# Patient Record
Sex: FEMALE | Race: BLACK OR AFRICAN AMERICAN | NOT HISPANIC OR LATINO | URBAN - METROPOLITAN AREA
[De-identification: names, ages, dates, MRNs, and addresses within clinical notes are randomized per-mention and may not be internally consistent; named-entity substitution may affect disease eponyms.]

---

## 2024-08-07 ENCOUNTER — OFFICE VISIT (OUTPATIENT)
Dept: OBSTETRICS AND GYNECOLOGY | Facility: CLINIC | Age: 34
End: 2024-08-07
Payer: COMMERCIAL

## 2024-08-07 VITALS
WEIGHT: 203 LBS | HEIGHT: 61 IN | SYSTOLIC BLOOD PRESSURE: 132 MMHG | DIASTOLIC BLOOD PRESSURE: 80 MMHG | BODY MASS INDEX: 38.33 KG/M2

## 2024-08-07 DIAGNOSIS — N93.0 POSTCOITAL BLEEDING: Primary | ICD-10-CM

## 2024-08-07 DIAGNOSIS — Z11.3 ROUTINE SCREENING FOR STI (SEXUALLY TRANSMITTED INFECTION): ICD-10-CM

## 2024-08-07 DIAGNOSIS — N86 CERVICAL EROSION: ICD-10-CM

## 2024-08-07 PROCEDURE — 87591 N.GONORRHOEAE DNA AMP PROB: CPT

## 2024-08-07 PROCEDURE — 3008F BODY MASS INDEX DOCD: CPT | Performed by: OBSTETRICS & GYNECOLOGY

## 2024-08-07 PROCEDURE — 87491 CHLMYD TRACH DNA AMP PROBE: CPT

## 2024-08-07 PROCEDURE — 1036F TOBACCO NON-USER: CPT | Performed by: OBSTETRICS & GYNECOLOGY

## 2024-08-07 PROCEDURE — 99204 OFFICE O/P NEW MOD 45 MIN: CPT | Performed by: OBSTETRICS & GYNECOLOGY

## 2024-08-07 NOTE — PROGRESS NOTES
Subjective   Patient ID: Pam Bains is a 33 y.o. female who presents for Consult.  New patient.  33 years old.  She has a boyfriend.  No pregnancies.  No plans to conceive currently but possibly in the future.  Using condoms for contraception no meds no surgeries.  No medical allergies    Cycles are monthly lasting about 5 days first 2 days heavy 1 day of cramping    She states that for the past month or so she has been having bleeding after sex.  She has been with her current boyfriend for 3 months.  On exam today I see a cervical ectropion.  We reviewed that this is a relatively common condition that is not dangerous but can cause symptoms.  We will send her urine for an STD screen.  I have given her the option of a LEEP procedure if it is affecting her quality of life.  She will consider it.  I will recommend that she follow-up in the next month or 2 for her annual exam and let me know if she wants to do anything regarding the postcoital bleeding        Review of Systems   Genitourinary:  Positive for vaginal bleeding.       Objective   Physical Exam  Genitourinary:     General: Normal vulva.            Comments: Cervical ectropion identified as likely source of bleeding        Assessment/Plan   Postcoital bleeding likely secondary to cervical ectropion.  Last Pap 2023 was normal.  Follow-up for annual exam.  Consider LEEP procedure if she wants to have this addressed         Eran Sanz MD 08/07/24 2:42 PM

## 2024-08-08 LAB
C TRACH RRNA SPEC QL NAA+PROBE: NEGATIVE
N GONORRHOEA DNA SPEC QL PROBE+SIG AMP: NEGATIVE

## 2024-08-14 ENCOUNTER — APPOINTMENT (OUTPATIENT)
Dept: OBSTETRICS AND GYNECOLOGY | Facility: CLINIC | Age: 34
End: 2024-08-14
Payer: COMMERCIAL

## 2024-09-04 ENCOUNTER — APPOINTMENT (OUTPATIENT)
Dept: OBSTETRICS AND GYNECOLOGY | Facility: CLINIC | Age: 34
End: 2024-09-04
Payer: COMMERCIAL

## 2024-09-24 ENCOUNTER — APPOINTMENT (OUTPATIENT)
Dept: OBSTETRICS AND GYNECOLOGY | Facility: CLINIC | Age: 34
End: 2024-09-24
Payer: COMMERCIAL

## 2025-04-04 ENCOUNTER — OFFICE VISIT (OUTPATIENT)
Dept: URGENT CARE | Age: 35
End: 2025-04-04
Payer: COMMERCIAL

## 2025-04-04 VITALS
BODY MASS INDEX: 36.84 KG/M2 | WEIGHT: 195 LBS | RESPIRATION RATE: 16 BRPM | SYSTOLIC BLOOD PRESSURE: 138 MMHG | HEART RATE: 63 BPM | DIASTOLIC BLOOD PRESSURE: 80 MMHG | OXYGEN SATURATION: 97 % | TEMPERATURE: 98.6 F

## 2025-04-04 DIAGNOSIS — S83.92XA SPRAIN OF LEFT KNEE, UNSPECIFIED LIGAMENT, INITIAL ENCOUNTER: Primary | ICD-10-CM

## 2025-04-04 PROCEDURE — 99203 OFFICE O/P NEW LOW 30 MIN: CPT | Performed by: NURSE PRACTITIONER

## 2025-04-04 PROCEDURE — 1036F TOBACCO NON-USER: CPT | Performed by: NURSE PRACTITIONER

## 2025-04-04 RX ORDER — IBUPROFEN 800 MG/1
800 TABLET ORAL EVERY 6 HOURS PRN
Qty: 28 TABLET | Refills: 0 | Status: SHIPPED | OUTPATIENT
Start: 2025-04-04 | End: 2025-04-11

## 2025-04-04 ASSESSMENT — ENCOUNTER SYMPTOMS
WOUND: 0
JOINT SWELLING: 0
NUMBNESS: 0
ARTHRALGIAS: 1
FEVER: 0
COLOR CHANGE: 0
FATIGUE: 0

## 2025-04-04 ASSESSMENT — PAIN SCALES - GENERAL: PAINLEVEL_OUTOF10: 7

## 2025-04-04 NOTE — PROGRESS NOTES
Subjective   Patient ID: Pam Bains is a 34 y.o. female. They present today with a chief complaint of Injury (Left knee popped yesterday).    History of Present Illness  Otherwise healthy 34-year-old female presents to the clinic today for evaluation of the left knee pain.  Patient states she was doing jumping jacks with weights in her hands (totaling 20 pounds) and when she landed she felt her left knee give out and go inwards.  States she is having pain when she walks on it and feeling like she is having to walk on the outside of her foot.  [place]  Denies previous trauma/injury.  Denies numbness and tingling.  No OTC measures taken yet at this time.          Past Medical History  Allergies as of 04/04/2025    (No Known Allergies)       (Not in a hospital admission)       History reviewed. No pertinent past medical history.    History reviewed. No pertinent surgical history.     reports that she has never smoked. She has never used smokeless tobacco.    Review of Systems  Review of Systems   Constitutional:  Negative for fatigue and fever.   Musculoskeletal:  Positive for arthralgias. Negative for joint swelling.   Skin:  Negative for color change and wound.   Neurological:  Negative for numbness.   All other systems reviewed and are negative.                                 Objective    Vitals:    04/04/25 0811   BP: 138/80   Pulse: 63   Resp: 16   Temp: 37 °C (98.6 °F)   TempSrc: Oral   SpO2: 97%   Weight: 88.5 kg (195 lb)     Patient's last menstrual period was 03/26/2025.    Physical Exam  Vitals and nursing note reviewed.   Constitutional:       Appearance: Normal appearance.   HENT:      Head: Normocephalic and atraumatic.   Cardiovascular:      Pulses: Normal pulses.   Pulmonary:      Effort: Pulmonary effort is normal.   Musculoskeletal:      Left knee: Swelling present. No effusion, erythema, ecchymosis, bony tenderness or crepitus. Normal range of motion. Tenderness  present. Normal alignment.   Skin:     General: Skin is warm and dry.      Capillary Refill: Capillary refill takes less than 2 seconds.   Neurological:      General: No focal deficit present.      Mental Status: She is alert and oriented to person, place, and time.         Procedures    Point of Care Test & Imaging Results from this visit  No results found for this visit on 04/04/25.   Imaging  No results found.    Cardiology, Vascular, and Other Imaging  No other imaging results found for the past 2 days      Diagnostic study results (if any) were reviewed by Kristin L Schoenlein, APRN-CNP.    Assessment/Plan   Allergies, medications, history, and pertinent labs/EKGs/Imaging reviewed by Kristin L Schoenlein, APRN-CNP.     Medical Decision Making  VSS, NAD, Nontoxic appearing.  Left knee has a small healing abrasion from patient scratching her knee at a concert on Tuesday.  No generalized edema.  There is no erythema, ecchymosis, effusion, lesions, masses, rashes.  Patient has full range of motion throughout, however, pain with extension.  Tenderness to posterior knee.  No step-off.  Pushes 5/5 equal bilaterally.    Symptoms, history, and exam are consistent with: Left knee strain.  Placed patient on NSAIDs, RICE, encouraged to get J hinged knee brace at a drugstore or online as we do not currently have any in the clinic.  No referral for sports med made on patient's behalf if worsening/not improving.  No indication for imaging at this time.    Differential Dx include, however, are not limited to: Fracture, dislocation, effusion    I have a low suspicion for any acute pathologies requiring emergent evaluation and further workup at this time.  I believe patient is safe to discharge home with a low threshold for emergency room as discussed during visit.  We discussed close follow-up with primary care provider/pediatrician. Supportive care discussed.  Medication(s) profile of OTC and Rxed medication(s) if prescribed  was (were) reviewed.  All questions answered and addressed.  Patient verbalized understanding.      Orders and Diagnoses  Diagnoses and all orders for this visit:  Sprain of left knee, unspecified ligament, initial encounter  -     ibuprofen 800 mg tablet; Take 1 tablet (800 mg) by mouth every 6 hours if needed (pain/swelling) for up to 7 days. Take with food  -     Referral to Orthopedics and Sports Medicine; Future      Medical Admin Record      Patient disposition: Home    Electronically signed by Kristin L Schoenlein, APRN-CNP  8:42 AM

## 2025-04-04 NOTE — PATIENT INSTRUCTIONS
Thank you for letting me care for you today.  You have been seen today for left knee sprain  Rest  Ice 20 minutes on the area 3-4 times a day  Compression with knee sleeve  Elevate while resting  Follow-up with Ortho if not improving/worsening  Please follow up with your primary care provider/pediatrician as directed.   If one is needed, please call [phone].  Please seek care in emergency room for red flags as discussed during visit.

## 2025-04-04 NOTE — LETTER
April 4, 2025     Patient: Pam Bains   YOB: 1990   Date of Visit: 4/4/2025       To Whom It May Concern:    Pam Bains was seen in my clinic on 4/4/2025 at 8:00 am. Please excuse Pam for her absence from work on this day to make the appointment.  Patient may return to work on 4/6/2025 without restrictions.    If you have any questions or concerns, please don't hesitate to call.         Sincerely,         Kristin L Schoenlein, APRN-CNP        CC: No Recipients

## 2025-04-15 ENCOUNTER — APPOINTMENT (OUTPATIENT)
Facility: CLINIC | Age: 35
End: 2025-04-15
Payer: COMMERCIAL

## 2025-04-15 VITALS
BODY MASS INDEX: 38.33 KG/M2 | DIASTOLIC BLOOD PRESSURE: 84 MMHG | WEIGHT: 203 LBS | HEIGHT: 61 IN | SYSTOLIC BLOOD PRESSURE: 135 MMHG

## 2025-04-15 DIAGNOSIS — N93.0 POSTCOITAL BLEEDING: Primary | ICD-10-CM

## 2025-04-15 DIAGNOSIS — N86 CERVICAL ECTROPION: ICD-10-CM

## 2025-04-15 PROCEDURE — 99459 PELVIC EXAMINATION: CPT | Performed by: OBSTETRICS & GYNECOLOGY

## 2025-04-15 PROCEDURE — 1036F TOBACCO NON-USER: CPT | Performed by: OBSTETRICS & GYNECOLOGY

## 2025-04-15 PROCEDURE — 99214 OFFICE O/P EST MOD 30 MIN: CPT | Performed by: OBSTETRICS & GYNECOLOGY

## 2025-04-15 PROCEDURE — 3008F BODY MASS INDEX DOCD: CPT | Performed by: OBSTETRICS & GYNECOLOGY

## 2025-04-15 ASSESSMENT — PATIENT HEALTH QUESTIONNAIRE - PHQ9
SUM OF ALL RESPONSES TO PHQ9 QUESTIONS 1 & 2: 0
1. LITTLE INTEREST OR PLEASURE IN DOING THINGS: NOT AT ALL
2. FEELING DOWN, DEPRESSED OR HOPELESS: NOT AT ALL

## 2025-04-15 NOTE — PROGRESS NOTES
"SUBJECTIVE    34 y.o.  Having periods female presents for   Chief Complaint   Patient presents with    Vaginal Bleeding     Abnormal bleeding      Pt presents for problem visit. She is complaining of bleeding after intercourse.  Menses are regular, but for the last year she has had intermittent bleeding after intercourse.  She saw Dr. Sanz and was diagnosed with a cervical ectropion.  A LEEP was discussed. After her last episode of intercourse she bled heavy \"like a period.\" Bleeding always stops quickly and she does not wear a pad.    Last PAP 2023 was normal/neg.    OB/GYN History  Patient's last menstrual period was 2025.    Social History     Substance and Sexual Activity   Sexual Activity Yes    Partners: Male       OB History    Para Term  AB Living   0 0 0 0 0 0   SAB IAB Ectopic Multiple Live Births   0 0 0 0 0       Past Medical History  She has no past medical history on file.    Surgical History  She has no past surgical history on file.     Social History  She reports that she has never smoked. She has never used smokeless tobacco. She reports current alcohol use. She reports that she does not use drugs.    Medications:  No current outpatient medications on file.    Screenings  Social Drivers of Health     Tobacco Use: Low Risk  (4/15/2025)    Patient History     Smoking Tobacco Use: Never     Smokeless Tobacco Use: Never     Passive Exposure: Not on file   Alcohol Use: Not on file   Financial Resource Strain: Medium Risk (2023)    Received from [employer]    Overall Financial Resource Strain (CARDIA)     Difficulty of Paying Living Expenses: Somewhat hard   Food Insecurity: No Food Insecurity (2023)    Received from [employer]    Hunger Vital Sign     Worried About Running Out of Food in the Last Year: Never true     Ran Out of Food in the Last Year: Never true   Transportation Needs: No Transportation Needs (2023)    Received from [employer]    PRAKENNYE " "- Transportation     Lack of Transportation (Medical): No     Lack of Transportation (Non-Medical): No   Physical Activity: Insufficiently Active (11/14/2023)    Received from [employer]    Exercise Vital Sign     Days of Exercise per Week: 2 days     Minutes of Exercise per Session: 60 min   Stress: No Stress Concern Present (11/14/2023)    Received from [employer]    [place] of [employer] - Occupational Stress Questionnaire     Feeling of Stress : Only a little   Social Connections: Socially Isolated (11/14/2023)    Received from [employer]    Social Connection and Isolation Panel [NHANES]     Frequency of Communication with Friends and Family: More than three times a week     Frequency of Social Gatherings with Friends and Family: Three times a week     Attends Voodoo Services: Never     Active Member of Clubs or Organizations: No     Attends Club or Organization Meetings: Never     Marital Status: Never    Intimate Partner Violence: Not At Risk (11/14/2023)    Received from [employer]    Humiliation, Afraid, Rape, and Kick questionnaire     Fear of Current or Ex-Partner: No     Emotionally Abused: No     Physically Abused: No     Sexually Abused: No   Depression: Not at risk (4/15/2025)    PHQ-2     PHQ-2 Score: 0   Housing Stability: Not on file   Utilities: Not on file   Digital Equity: Not on file   Health Literacy: Not on file         OBJECTIVE  Vitals:    04/15/25 1117   BP: 135/84   Weight: 92.1 kg (203 lb)   Height: 1.549 m (5' 1\")     Body mass index is 38.36 kg/m².     Chaperone: Present: yes  Physical Exam  Constitutional:       Appearance: Normal appearance.   Genitourinary:      No lesions in the vagina.      Genitourinary Comments: There is a prominent ectropion posteriorly.      Right Labia: No rash, tenderness or lesions.     Left Labia: No tenderness, lesions or rash.     No vaginal erythema, bleeding or ulceration.      No cervical discharge, friability or " lesion.         Abdominal:      General: Abdomen is flat. There is no distension.      Tenderness: There is no abdominal tenderness.   Neurological:      Mental Status: She is alert.       ASSESSMENT & PLAN  Problem List Items Addressed This Visit    None  Visit Diagnoses       Postcoital bleeding    -  Primary    Cervical ectropion                Follow up: Discussed options for management. Will opt for a trial of acidifying agent (Boric Acid Suppositories 600mg nightly x14 days).  If this does not resolve sx will consider LEEP/cryo.    Pantera Sheldon MD  Obstetrics & Gynecology  04/15/25